# Patient Record
Sex: MALE | Race: WHITE | NOT HISPANIC OR LATINO | Employment: OTHER | URBAN - METROPOLITAN AREA
[De-identification: names, ages, dates, MRNs, and addresses within clinical notes are randomized per-mention and may not be internally consistent; named-entity substitution may affect disease eponyms.]

---

## 2023-08-25 ENCOUNTER — TELEPHONE (OUTPATIENT)
Dept: NEUROLOGY | Facility: CLINIC | Age: 63
End: 2023-08-25
Payer: COMMERCIAL

## 2023-08-25 NOTE — TELEPHONE ENCOUNTER
----- Message from Juan Pablobuddyclaudia FRAUSTO Route sent at 8/24/2023  4:47 PM CDT -----  Regarding: Appointment  Contact: pt. [phone]  Pt's wife Francesca is calling to schedule an appt with provider. Pt has has positive blood marker for Myasthenia Gravis. Pt is asking to see provider. Patient Requesting Call Back @ [phone]

## 2023-08-28 ENCOUNTER — TELEPHONE (OUTPATIENT)
Dept: NEUROLOGY | Facility: CLINIC | Age: 63
End: 2023-08-28
Payer: COMMERCIAL

## 2023-08-28 NOTE — TELEPHONE ENCOUNTER
----- Message from Sheila Cifuentes MA sent at 8/24/2023  5:40 PM CDT -----  Regarding: FW: Appointment  Contact: pt. [phone]    ----- Message -----  From: Keron Moreau  Sent: 8/24/2023   4:52 PM CDT  To: [facility] Staff  Subject: Appointment                                      Pt's wife Francesca is calling to schedule an appt with provider. Pt has has positive blood marker for Myasthenia Gravis. Pt is asking to see provider. Patient Requesting Call Back @ [phone]

## 2023-08-28 NOTE — TELEPHONE ENCOUNTER
I returned phone call to spouse Francesca.  She informed this RN that patient was diagnosed less than 1 week ago, has started some treatment, most prevalent s/s is Ptosis.  I provided fax number where her provider can send us the referral and H&P to review, and she will contact provider and request that they fax it over to our clinic.

## 2023-10-06 ENCOUNTER — TELEPHONE (OUTPATIENT)
Dept: NEUROLOGY | Facility: CLINIC | Age: 63
End: 2023-10-06
Payer: COMMERCIAL

## 2023-10-09 ENCOUNTER — TELEPHONE (OUTPATIENT)
Dept: NEUROLOGY | Facility: CLINIC | Age: 63
End: 2023-10-09
Payer: COMMERCIAL

## 2023-10-09 NOTE — TELEPHONE ENCOUNTER
----- Message from Caitlin Braxton MA sent at 10/6/2023  8:45 AM CDT -----  Contact: [phone]  Good morning please assits pt thank you.  ----- Message -----  From: Titi Felix  Sent: 10/6/2023   8:44 AM CDT  To: [facility] Staff    Osmar Spencer calling regarding Appointment Access  (message) Pt asking for a call back trying to get reschedule for his appt that he had to cancel that was schedule for 09/25 asking to speak with the nurse